# Patient Record
Sex: MALE | Race: WHITE | Employment: FULL TIME | URBAN - METROPOLITAN AREA
[De-identification: names, ages, dates, MRNs, and addresses within clinical notes are randomized per-mention and may not be internally consistent; named-entity substitution may affect disease eponyms.]

---

## 2023-05-02 ENCOUNTER — HOSPITAL ENCOUNTER (EMERGENCY)
Age: 33
Discharge: HOME | End: 2023-05-02
Attending: EMERGENCY MEDICINE

## 2023-05-02 ENCOUNTER — APPOINTMENT (OUTPATIENT)
Dept: GENERAL RADIOLOGY | Age: 33
End: 2023-05-02

## 2023-05-02 VITALS
RESPIRATION RATE: 16 BRPM | HEART RATE: 97 BPM | DIASTOLIC BLOOD PRESSURE: 93 MMHG | BODY MASS INDEX: 25.69 KG/M2 | WEIGHT: 159.83 LBS | TEMPERATURE: 97.2 F | OXYGEN SATURATION: 98 % | HEIGHT: 66 IN | SYSTOLIC BLOOD PRESSURE: 140 MMHG

## 2023-05-02 DIAGNOSIS — S86.111A GASTROCNEMIUS STRAIN, RIGHT, INITIAL ENCOUNTER: Primary | ICD-10-CM

## 2023-05-02 PROCEDURE — 73590 X-RAY EXAM OF LOWER LEG: CPT

## 2023-05-02 PROCEDURE — 99283 EMERGENCY DEPT VISIT LOW MDM: CPT

## 2023-05-02 RX ORDER — IBUPROFEN 800 MG/1
800 TABLET ORAL 3 TIMES DAILY PRN
Qty: 15 TABLET | Refills: 0 | Status: SHIPPED | OUTPATIENT
Start: 2023-05-02 | End: 2023-05-07

## 2023-05-02 RX ORDER — CYCLOBENZAPRINE HCL 10 MG
10 TABLET ORAL 3 TIMES DAILY PRN
Qty: 21 TABLET | Refills: 0 | Status: SHIPPED | OUTPATIENT
Start: 2023-05-02 | End: 2023-05-12

## 2023-05-02 ASSESSMENT — PAIN - FUNCTIONAL ASSESSMENT
PAIN_FUNCTIONAL_ASSESSMENT: NONE - DENIES PAIN
PAIN_FUNCTIONAL_ASSESSMENT: NONE - DENIES PAIN

## 2023-05-02 NOTE — ED TRIAGE NOTES
Pt states 4 days ago he was running and felt a pop in his lower part of calf on right leg. Pain with walking, none at rest. Walks with a limp. Tingling in foot while at work today, positive sensation, movement and brisk capillary refill.

## 2023-05-03 ENCOUNTER — TELEPHONE (OUTPATIENT)
Dept: ORTHOPEDIC SURGERY | Age: 33
End: 2023-05-03

## 2023-05-03 NOTE — DISCHARGE INSTRUCTIONS
Do not place any weight on this leg. You must use a walker or crutches to ambulate at all times. Continue this therapy until further instructions from your healthcare provider with whom you follow up.

## 2023-05-03 NOTE — ED PROVIDER NOTES
Medications - No data to display    Discharge Medication List as of 5/2/2023  9:21 PM        START taking these medications    Details   ibuprofen (ADVIL;MOTRIN) 800 MG tablet Take 1 tablet by mouth 3 times daily as needed for Pain, Disp-15 tablet, R-0Print      cyclobenzaprine (FLEXERIL) 10 MG tablet Take 1 tablet by mouth 3 times daily as needed for Muscle spasms, Disp-21 tablet, R-0Print             SEP-1 CORE MEASURE DATA  Exclusion criteria: the patient is NOT to be included for sepsis due to: Infection is not suspected    Patient remained stable in the ED. x-rays were negative. Therefore, patient was discharged with diagnosis of gastrocnemius strain. He was referred to orthopedics for evaluation of his lower extremity. I feel that this could be a possible tendon tear on the right leg. Therefore, patient was referred to orthopedics and instructed follow with them in 2 days and return to emergency department for any problems. He was prescribed ibuprofen and Flexeril for discharge. He was given crutches instructed nonweightbearing. Diagnostic considerations include but are not limited to: Arterial Injury/Ischemia, Fracture, Dislocation, Infection, Compartment Syndrome, Neurologic Deficit/Injury. Radiograph-x-rays were ordered to rule out fractures, dislocations, abnormal bone pathology, pathologic fractures, joint abnormalities, joint effusions, or any other pathology that might be causing the patient's symptoms    The patient's blood pressure was found to be elevated according to CMS/Medicare and the Affordable Care Act/ObamaCare criteria. Elevated blood pressure could occur because of pain or anxiety or other reasons and does not mean that they need to have their blood pressure treated or medications otherwise adjusted. However, this could also be a sign that they will need to have their blood pressure treated or medications changed.     The patient was instructed to follow up closely with

## 2023-05-03 NOTE — TELEPHONE ENCOUNTER
Patient referred to Dr. Bebe Black from Emergency Department. Called patient in regards to scheduling an appointment to follow up with orthopedics. Upon return call, please schedule patient in open time slot.  OKAY to overbook

## 2023-05-24 ENCOUNTER — APPOINTMENT (OUTPATIENT)
Dept: GENERAL RADIOLOGY | Age: 33
End: 2023-05-24
Payer: COMMERCIAL

## 2023-05-24 ENCOUNTER — HOSPITAL ENCOUNTER (EMERGENCY)
Age: 33
Discharge: HOME | End: 2023-05-24
Attending: EMERGENCY MEDICINE
Payer: COMMERCIAL

## 2023-05-24 VITALS
OXYGEN SATURATION: 97 % | HEART RATE: 93 BPM | RESPIRATION RATE: 16 BRPM | TEMPERATURE: 99 F | SYSTOLIC BLOOD PRESSURE: 143 MMHG | HEIGHT: 67 IN | BODY MASS INDEX: 25.43 KG/M2 | WEIGHT: 162.04 LBS | DIASTOLIC BLOOD PRESSURE: 91 MMHG

## 2023-05-24 DIAGNOSIS — L03.114 CELLULITIS OF LEFT HAND: Primary | ICD-10-CM

## 2023-05-24 LAB
BASOPHILS # BLD: 0.1 K/UL (ref 0–0.2)
BASOPHILS NFR BLD: 1 %
CRP SERPL-MCNC: 5 MG/L (ref 0–5.1)
DEPRECATED RDW RBC AUTO: 13.8 % (ref 12.4–15.4)
EOSINOPHIL # BLD: 0.4 K/UL (ref 0–0.6)
EOSINOPHIL NFR BLD: 3.7 %
ERYTHROCYTE [SEDIMENTATION RATE] IN BLOOD BY WESTERGREN METHOD: 2 MM/HR (ref 0–15)
HCT VFR BLD AUTO: 46.1 % (ref 40.5–52.5)
HGB BLD-MCNC: 16.3 G/DL (ref 13.5–17.5)
LYMPHOCYTES # BLD: 2.1 K/UL (ref 1–5.1)
LYMPHOCYTES NFR BLD: 20.4 %
MCH RBC QN AUTO: 30.4 PG (ref 26–34)
MCHC RBC AUTO-ENTMCNC: 35.4 G/DL (ref 31–36)
MCV RBC AUTO: 86 FL (ref 80–100)
MONOCYTES # BLD: 0.8 K/UL (ref 0–1.3)
MONOCYTES NFR BLD: 7.4 %
NEUTROPHILS # BLD: 7.1 K/UL (ref 1.7–7.7)
NEUTROPHILS NFR BLD: 67.5 %
PLATELET # BLD AUTO: 185 K/UL (ref 135–450)
PMV BLD AUTO: 10.1 FL (ref 5–10.5)
RBC # BLD AUTO: 5.36 M/UL (ref 4.2–5.9)
WBC # BLD AUTO: 10.5 K/UL (ref 4–11)

## 2023-05-24 PROCEDURE — 73130 X-RAY EXAM OF HAND: CPT

## 2023-05-24 PROCEDURE — 85025 COMPLETE CBC W/AUTO DIFF WBC: CPT

## 2023-05-24 PROCEDURE — 6360000002 HC RX W HCPCS: Performed by: EMERGENCY MEDICINE

## 2023-05-24 PROCEDURE — 86140 C-REACTIVE PROTEIN: CPT

## 2023-05-24 PROCEDURE — 36415 COLL VENOUS BLD VENIPUNCTURE: CPT

## 2023-05-24 PROCEDURE — 99284 EMERGENCY DEPT VISIT MOD MDM: CPT

## 2023-05-24 PROCEDURE — 90471 IMMUNIZATION ADMIN: CPT | Performed by: EMERGENCY MEDICINE

## 2023-05-24 PROCEDURE — 85652 RBC SED RATE AUTOMATED: CPT

## 2023-05-24 PROCEDURE — 2500000003 HC RX 250 WO HCPCS: Performed by: EMERGENCY MEDICINE

## 2023-05-24 PROCEDURE — 96365 THER/PROPH/DIAG IV INF INIT: CPT

## 2023-05-24 PROCEDURE — 90715 TDAP VACCINE 7 YRS/> IM: CPT | Performed by: EMERGENCY MEDICINE

## 2023-05-24 RX ORDER — IBUPROFEN 800 MG/1
800 TABLET ORAL 3 TIMES DAILY PRN
Qty: 15 TABLET | Refills: 0 | Status: SHIPPED | OUTPATIENT
Start: 2023-05-24 | End: 2023-05-29

## 2023-05-24 RX ORDER — CLINDAMYCIN PHOSPHATE 600 MG/50ML
600 INJECTION, SOLUTION INTRAVENOUS ONCE
Status: COMPLETED | OUTPATIENT
Start: 2023-05-24 | End: 2023-05-24

## 2023-05-24 RX ORDER — CLINDAMYCIN HYDROCHLORIDE 300 MG/1
300 CAPSULE ORAL 3 TIMES DAILY
Qty: 30 CAPSULE | Refills: 0 | Status: SHIPPED | OUTPATIENT
Start: 2023-05-24 | End: 2023-06-03

## 2023-05-24 RX ADMIN — CLINDAMYCIN PHOSPHATE 600 MG: 600 INJECTION, SOLUTION INTRAVENOUS at 12:44

## 2023-05-24 RX ADMIN — TETANUS TOXOID, REDUCED DIPHTHERIA TOXOID AND ACELLULAR PERTUSSIS VACCINE, ADSORBED 0.5 ML: 5; 2.5; 8; 8; 2.5 SUSPENSION INTRAMUSCULAR at 13:53

## 2023-05-24 ASSESSMENT — PAIN DESCRIPTION - ORIENTATION: ORIENTATION: LEFT

## 2023-05-24 ASSESSMENT — PAIN DESCRIPTION - PAIN TYPE: TYPE: ACUTE PAIN

## 2023-05-24 ASSESSMENT — PAIN DESCRIPTION - LOCATION: LOCATION: HAND

## 2023-05-24 ASSESSMENT — PAIN DESCRIPTION - DESCRIPTORS: DESCRIPTORS: SORE

## 2023-05-24 ASSESSMENT — PAIN SCALES - GENERAL
PAINLEVEL_OUTOF10: 2
PAINLEVEL_OUTOF10: 2

## 2023-05-24 NOTE — ED NOTES
Work related injury. Pt is a  and states yesterday \"metal steering knuckle\" came down onto top of his left hand. Approx 1 inch abrasion noted there with mod amt of swelling to top of hand. Pain at 2 now and up to 6 when moving left hand more. No OTC pain meds taken. Natural sensation. Strong left radial/ulnar pulses. No other injury. No urine/drug screen needed for occupational health injury.      Que Mckeon RN  05/24/23 [id number]

## 2023-05-24 NOTE — ED PROVIDER NOTES
[address] [employer]      Pt Name: Shireen Rocha  MRN: [id number]  [place] 1990  Date of evaluation: 5/24/2023  Provider: Liza Issa DO    CHIEF COMPLAINT  Chief Complaint   Patient presents with    Hand Injury     Work related injury. Pt is a  and states yesterday \"metal steering knuckle\" came down onto top of his left hand. Approx 1 inch abrasion noted there with mod amt of swelling to top of hand. Pain at 2 now and up to 6 when moving left hand more. No OTC pain meds taken. Natural sensation. Strong left radial/ulnar pulses. No other injury. Work Related Injury         This patient is at risk for a communicable infection. Therefore, personal protection equipment consisting of a mask was worn for the exam.    HPI  Shireen Rocha is a 29 y.o. male who presents with complaint of working as a  and states that yesterday a metal steering \"knuckle\" came down on his hand and abraded his left hand. He is right-handed. He has had increased pain and swelling of his right hand. He denies any altercations. The denies any previous fractures. Nothing makes it better or worse. He describes it as moderate he denies any drainage from the area. He states it hurts to move. REVIEW OF SYSTEMS  All systems negative except as noted in the HPI. Reviewed Nurses' notes and concur. No LMP for male patient. PAST MEDICAL HISTORY  Past Medical History:   Diagnosis Date    Asthma        FAMILY HISTORY  History reviewed. No pertinent family history. SOCIAL HISTORY   reports that he has quit smoking. He does not have any smokeless tobacco history on file. He reports that he does not currently use alcohol. SURGICAL HISTORY  History reviewed. No pertinent surgical history.     CURRENT MEDICATIONS  Current Outpatient Rx   Medication Sig Dispense Refill    clindamycin (CLEOCIN) 300 MG capsule Take 1 capsule by mouth 3 times daily

## 2025-02-13 ENCOUNTER — APPOINTMENT (OUTPATIENT)
Dept: GENERAL RADIOLOGY | Age: 35
End: 2025-02-13

## 2025-02-13 ENCOUNTER — HOSPITAL ENCOUNTER (EMERGENCY)
Age: 35
Discharge: HOME | End: 2025-02-13

## 2025-02-13 VITALS
OXYGEN SATURATION: 100 % | TEMPERATURE: 98.4 F | DIASTOLIC BLOOD PRESSURE: 112 MMHG | HEART RATE: 80 BPM | RESPIRATION RATE: 16 BRPM | SYSTOLIC BLOOD PRESSURE: 154 MMHG

## 2025-02-13 DIAGNOSIS — R03.0 ELEVATED BLOOD PRESSURE READING: ICD-10-CM

## 2025-02-13 DIAGNOSIS — S63.501A SPRAIN OF RIGHT WRIST, INITIAL ENCOUNTER: Primary | ICD-10-CM

## 2025-02-13 PROCEDURE — 99283 EMERGENCY DEPT VISIT LOW MDM: CPT

## 2025-02-13 PROCEDURE — 73110 X-RAY EXAM OF WRIST: CPT

## 2025-02-13 RX ORDER — IBUPROFEN 600 MG/1
600 TABLET, FILM COATED ORAL 3 TIMES DAILY PRN
Qty: 30 TABLET | Refills: 0 | Status: SHIPPED | OUTPATIENT
Start: 2025-02-13

## 2025-02-13 RX ORDER — ACETAMINOPHEN 500 MG
1000 TABLET ORAL EVERY 8 HOURS PRN
Qty: 42 TABLET | Refills: 0 | Status: SHIPPED | OUTPATIENT
Start: 2025-02-13 | End: 2025-02-20

## 2025-02-13 NOTE — ED NOTES
Patient DCed from ED at this time. Discussed AVS, follow up, and scripts. They verbalized understanding. Reinforced that should symptoms persist or worsen to return to the ED. They verbalized understanding. BP elevated, ED PA ok with DC. Reinforced to follow up with PCP. Patient ambulated out of ED. RN thanked patient [facility]

## 2025-02-13 NOTE — ED PROVIDER NOTES
[facility] EMERGENCY DEPARTMENT  EMERGENCY DEPARTMENT ENCOUNTER        Pt Name: Thang Tyler  MRN: [id number]  Birthdate 1990  Date of evaluation: 2/13/2025  Provider: Elicia Vu PA-C  PCP: No primary care provider on file.  Note Started: 2:43 PM EST 2/13/25      SURAJ. I have evaluated this patient.        CHIEF COMPLAINT       Chief Complaint   Patient presents with    Wrist Injury     A wrench slipped Tuesday at work, increasing pain. Tried to work today and wrist started to swell, + pulses       HISTORY OF PRESENT ILLNESS: 1 or more Elements             Thang Tyler is a 35 y.o. male who presents with complaint of right wrist injury that occurred over the past 1 day.  He states that he dropped a wrench and feels as if he either hit his wrist or twisted it incorrectly.  Since then he has been having pain.  He does endorse a history of sprains in his right wrist in the past.  He has not taken anything to help alleviate his symptoms.  He denies any numbness or tingling.  He denies any weakness, fever, chills.    Nursing Notes were all reviewed and agreed with or any disagreements were addressed in the HPI.    REVIEW OF SYSTEMS :      Review of Systems   All other systems reviewed and are negative.      Positives and Pertinent negatives as per HPI.     SURGICAL HISTORY   History reviewed. No pertinent surgical history.    CURRENTMEDICATIONS       Discharge Medication List as of 2/13/2025  2:27 PM        CONTINUE these medications which have NOT CHANGED    Details   albuterol (PROVENTIL HFA) 108 (90 BASE) MCG/ACT inhaler Inhale 2 puffs into the lungs every 6 hours as needed for Wheezing, Disp-1 Inhaler, R-3             ALLERGIES     Patient has no known allergies.    FAMILYHISTORY     History reviewed. No pertinent family history.     SOCIAL HISTORY       Social History     Tobacco Use    Smoking status: Former   Substance Use Topics    Alcohol use: Not Currently     Comment: sarah  returned as of this dictation.    EKG: When ordered, EKG's are interpreted by the Emergency Department Physician in the absence of a cardiologist.  Please see their note for interpretation of EKG.    RADIOLOGY:   Non-plain film images such as CT, Ultrasound and MRI are read by the radiologist. Plain radiographic images are visualized and preliminarily interpreted by the ED Provider with the below findings:        Interpretation per the Radiologist below, if available at the time of this note:    XR WRIST RIGHT (MIN 3 VIEWS)   Final Result   Normal wrist.           XR WRIST RIGHT (MIN 3 VIEWS)    Result Date: 2/13/2025  EXAMINATION: XRAY VIEWS OF THE RIGHT WRIST 2/13/2025 1:52 pm COMPARISON: None. HISTORY: ORDERING SYSTEM PROVIDED HISTORY: pain, hit with wrench TECHNOLOGIST PROVIDED HISTORY: Reason for exam:->pain, hit with wrench Reason for Exam: pain in right wrist ; pt was using a wrench which slipped pain in wrist since FINDINGS: The carpal bones appear unremarkable.  The distal radius and ulna are normal. The carpometacarpal and carpal joints are normal.  There are no fractures.     Normal wrist.       No results found.    PROCEDURES   Unless otherwise noted below, none     Procedures    CRITICAL CARE TIME (.cctime)       PAST MEDICAL HISTORY      has a past medical history of Asthma.     Chronic Conditions affecting Care:     EMERGENCY DEPARTMENT COURSE and DIFFERENTIAL DIAGNOSIS/MDM:   Vitals:    Vitals:    02/13/25 1347 02/13/25 1438   BP: (!) 170/118 (!) 154/112   Pulse: 91 80   Resp: 16 16   Temp: 98.4 °F (36.9 °C)    SpO2: 100% 100%       Patient was given the following medications:  Medications - No data to display          Is this patient to be included in the SEP-1 Core Measure due to severe sepsis or septic shock?   No   Exclusion criteria - the patient is NOT to be included for SEP-1 Core Measure due to:  Infection is not suspected    CONSULTS: (Who and What was discussed)  None              CC/HPI

## 2025-02-14 ENCOUNTER — TELEPHONE (OUTPATIENT)
Dept: ORTHOPEDIC SURGERY | Age: 35
End: 2025-02-14

## 2025-04-28 ENCOUNTER — HOSPITAL ENCOUNTER (EMERGENCY)
Age: 35
Discharge: HOME | End: 2025-04-28
Payer: COMMERCIAL

## 2025-04-28 ENCOUNTER — APPOINTMENT (OUTPATIENT)
Dept: GENERAL RADIOLOGY | Age: 35
End: 2025-04-28
Payer: COMMERCIAL

## 2025-04-28 VITALS
HEIGHT: 66 IN | OXYGEN SATURATION: 99 % | SYSTOLIC BLOOD PRESSURE: 141 MMHG | DIASTOLIC BLOOD PRESSURE: 98 MMHG | WEIGHT: 163.58 LBS | TEMPERATURE: 99.2 F | RESPIRATION RATE: 16 BRPM | HEART RATE: 92 BPM | BODY MASS INDEX: 26.29 KG/M2

## 2025-04-28 DIAGNOSIS — S89.91XA INJURY OF RIGHT KNEE, INITIAL ENCOUNTER: Primary | ICD-10-CM

## 2025-04-28 PROCEDURE — 99283 EMERGENCY DEPT VISIT LOW MDM: CPT

## 2025-04-28 PROCEDURE — 73562 X-RAY EXAM OF KNEE 3: CPT

## 2025-04-28 RX ORDER — NAPROXEN 500 MG/1
500 TABLET ORAL 2 TIMES DAILY
Qty: 14 TABLET | Refills: 0 | Status: SHIPPED | OUTPATIENT
Start: 2025-04-28

## 2025-04-28 ASSESSMENT — PAIN DESCRIPTION - DESCRIPTORS: DESCRIPTORS: BURNING

## 2025-04-28 ASSESSMENT — PAIN DESCRIPTION - PAIN TYPE: TYPE: ACUTE PAIN

## 2025-04-28 ASSESSMENT — PAIN DESCRIPTION - ORIENTATION: ORIENTATION: RIGHT

## 2025-04-28 ASSESSMENT — LIFESTYLE VARIABLES
HOW OFTEN DO YOU HAVE A DRINK CONTAINING ALCOHOL: MONTHLY OR LESS
HOW MANY STANDARD DRINKS CONTAINING ALCOHOL DO YOU HAVE ON A TYPICAL DAY: 1 OR 2

## 2025-04-28 ASSESSMENT — PAIN SCALES - GENERAL: PAINLEVEL_OUTOF10: 3

## 2025-04-28 ASSESSMENT — PAIN - FUNCTIONAL ASSESSMENT
PAIN_FUNCTIONAL_ASSESSMENT: 0-10
PAIN_FUNCTIONAL_ASSESSMENT: PREVENTS OR INTERFERES SOME ACTIVE ACTIVITIES AND ADLS

## 2025-04-28 ASSESSMENT — PAIN DESCRIPTION - LOCATION: LOCATION: KNEE

## 2025-04-29 NOTE — DISCHARGE INSTRUCTIONS
Dear Thang Tyler,     Thank you for the privilege of caring for you today in the Emergency Department.     I have sent a prescription to your pharmacy for naproxen to take twice daily for acute pain management.  While taking naproxen, please do not take any other medications in the NSAID class including ibuprofen, Advil, Motrin, Aleve, as these are all in the same class and in doing so increases the risk for peptic ulcers.  RICE instructions provided upon discharge here today.  I have also placed a referral to orthopedics for you to follow-up with, please call and schedule an appointment for further evaluation of your symptoms here today.    Please return to the Emergency Department if you develop any new or worsening symptoms, or for any other concerns.     Regards,     Roxana Slater PA-C

## 2025-04-29 NOTE — ED PROVIDER NOTES
not seen on plain films, and that they will require follow-up with orthopedics for further evaluation.  I believe the patient is safe for discharge at this time.  Send prescription for naproxen for acute pain management.  Advised patient not take naproxen do not take any other medications in the NSAID group including ibuprofen, Motrin, Advil, Aleve as these are in the same class and in doing so will increase risk of peptic ulcers.  Provided orthopedic referral for follow-up.    The patient was instructed to follow up as an outpatient in 3 days. The patient was instructed to return to the ED immediately for any new or worsening symptoms.  The patient verbalized understanding.    FINAL IMPRESSION    1. Injury of right knee, initial encounter        PLAN  Discharge with close outpatient follow-up (see EMR)     (Please note that this note was completed with a voice recognition program.  Every attempt was made to edit the dictations, but inevitably there remain words that are mis-transcribed.)       Roxana Slater PA-C  04/28/25 [id number]

## 2025-05-02 ENCOUNTER — OFFICE VISIT (OUTPATIENT)
Dept: ORTHOPEDIC SURGERY | Age: 35
End: 2025-05-02

## 2025-05-02 VITALS — WEIGHT: 167 LBS | BODY MASS INDEX: 26.84 KG/M2 | HEIGHT: 66 IN

## 2025-05-02 DIAGNOSIS — S83.411A SPRAIN OF MEDIAL COLLATERAL LIGAMENT OF RIGHT KNEE, INITIAL ENCOUNTER: Primary | ICD-10-CM

## 2025-05-02 NOTE — PROGRESS NOTES
[facility] Orthopedics Office Visit  Mk Horta MD    Reason for visit: Right knee pain    HPI:  Thang Tyler is a 35 y.o. who is here acute onset of right knee pain following an injury that occurred on April 27, 2025.  He was mowing wet grass on a hill side when his left leg slipped out on him causing his right knee to buckle medially.  There is no history of injury or surgery to the right knee prior to this.  He reports mostly medial joint line pain and burning.  He went to the ER the next day to be evaluated.  He has been taking naproxen and resting with moderate relief of symptoms.  He is walking without assistive device.  He rates pain as 1/10 today.    Past Medical History:   Diagnosis Date    Asthma        Exam:  Ht 1.676 m (5' 6\")   Wt 75.8 kg (167 lb)   BMI 26.95 kg/m²     Appearance: sitting in exam room chair, appears to be in no acute distress, awake and alert  Resp: unlabored breathing on room air  Skin: warm, dry and intact with out erythema or significant increased temperature  Neuro: grossly intact both lower extremities. Intact sensation to light touch. Motor exam 4+ to 5/5 in all major motor groups.  RLE: There is no knee joint effusion.  There is no ecchymosis around the knee.  He is tender along the MCL.  He is stable to varus and valgus stress at full extension and to anterior and posterior drawer at 90 degrees of flexion.  Negative Diamante exam.    Imaging:  3 views of the right knee were performed and interpreted today.  Prior right knee radiographs were also reviewed.  He has maintained joint spaces with no fractures or dislocations.    Assessment:  Right knee MCL strain    Plan:  We discussed the diagnosis and treatment options.  He will continue naproxen to help with pain control.  I recommended the use of a brace with medial and lateral supports to help his MCL injury heal.  We discussed activity limitation modification until his symptoms subside.  He will follow-up in 3 to 4 weeks if he